# Patient Record
Sex: FEMALE | URBAN - METROPOLITAN AREA
[De-identification: names, ages, dates, MRNs, and addresses within clinical notes are randomized per-mention and may not be internally consistent; named-entity substitution may affect disease eponyms.]

---

## 2019-08-01 ENCOUNTER — APPOINTMENT (RX ONLY)
Dept: URBAN - METROPOLITAN AREA CLINIC 12 | Facility: CLINIC | Age: 49
Setting detail: DERMATOLOGY
End: 2019-08-01

## 2019-08-01 DIAGNOSIS — Z41.9 ENCOUNTER FOR PROCEDURE FOR PURPOSES OTHER THAN REMEDYING HEALTH STATE, UNSPECIFIED: ICD-10-CM

## 2019-08-01 PROCEDURE — ? BOTOX

## 2019-08-01 NOTE — PROCEDURE: BOTOX
Administered By (Optional): Sofía Sawyer
Topical Anesthesia?: 23% lidocaine, 7% tetracaine
Additional Area 1 Units: 0
Glabellar Complex Units: 10
Price Per Unit In $ (Use Numbers Only, No Text Please.): 17
Map Statment: See attached map for complete details
Forehead Units: 5
Expiration Date (Month Year): 01/2022
Lot #: [id number]
Additional Area 1 Location: in the brow
Additional Area 2 Location: Chin
Consent: Written consent was obtained prior to the procedure. Risks, benefits, expectations and alternatives were discussed including, but not limited to, infection, bleeding, lid/brow ptosis, bruising, swelling, diplopia, temporary effects, incomplete chemical denervation and dissatisfaction with the cosmetic outcome. No guarantee or warranty was given or implied regarding longevity of results.
Periorbital Skin Units: 25
Show Price In Note?: yes
Detail Level: Simple
Postcare Instructions: Patient instructed to not lie down for 4 hours and limit physical activity for 24 hours. Patient instructed not to travel by airplane for 48 hours.
Bill Summary Price Listed Below, Or Bill Total Of Units X Price Per Unit?: Bill #Units x Price Per Unit
Additional Area 3 Location: nasalis depressor

## 2019-08-28 ENCOUNTER — APPOINTMENT (RX ONLY)
Dept: URBAN - METROPOLITAN AREA CLINIC 12 | Facility: CLINIC | Age: 49
Setting detail: DERMATOLOGY
End: 2019-08-28

## 2019-08-28 DIAGNOSIS — Z42.8 ENCOUNTER FOR OTHER PLASTIC AND RECONSTRUCTIVE SURGERY FOLLOWING MEDICAL PROCEDURE OR HEALED INJURY: ICD-10-CM

## 2019-08-28 DIAGNOSIS — Z41.9 ENCOUNTER FOR PROCEDURE FOR PURPOSES OTHER THAN REMEDYING HEALTH STATE, UNSPECIFIED: ICD-10-CM

## 2019-08-28 PROCEDURE — ? BOTOX

## 2019-08-28 PROCEDURE — ? HYALURONIDASE INJECTION

## 2019-08-28 ASSESSMENT — LOCATION ZONE DERM: LOCATION ZONE: FACE

## 2019-08-28 ASSESSMENT — LOCATION DETAILED DESCRIPTION DERM: LOCATION DETAILED: RIGHT SUPERIOR CENTRAL MALAR CHEEK

## 2019-08-28 ASSESSMENT — LOCATION SIMPLE DESCRIPTION DERM: LOCATION SIMPLE: RIGHT CHEEK

## 2019-08-28 NOTE — PROCEDURE: BOTOX
Forehead Units: 2.5
Use Map Statement For Sites (Optional): Yes
Additional Area 2 Units: 0
Dilution (U/0.1 Cc): 5
Additional Area 1 Location: in the brow
Bill Summary Price Listed Below, Or Bill Total Of Units X Price Per Unit?: Bill #Units x Price Per Unit
Postcare Instructions: Patient instructed to not lie down for 4 hours and limit physical activity for 24 hours. Patient instructed not to travel by airplane for 48 hours.
Price Per Unit In $ (Use Numbers Only, No Text Please.): 14
Expiration Date (Month Year): 01/2022
Map Statment: See attached map for complete details
Additional Area 2 Location: Chin
Administered By (Optional): Sofía Sawyer
Consent: Written consent was obtained prior to the procedure. Risks, benefits, expectations and alternatives were discussed including, but not limited to, infection, bleeding, lid/brow ptosis, bruising, swelling, diplopia, temporary effects, incomplete chemical denervation and dissatisfaction with the cosmetic outcome. No guarantee or warranty was given or implied regarding longevity of results.
Periorbital Skin Units: 10
Detail Level: Simple
Additional Area 3 Location: necklace lines
Lot #: [id number]

## 2019-08-28 NOTE — PROCEDURE: HYALURONIDASE INJECTION
Total Volume (Ccs): 0.5
Price (Use Numbers Only, No Special Characters Or $): 100
Lot # (Optional): [id number]
Hyaluronidase Preparation: hylenex
Treatment Number (Optional): 1
Expiration Date (Optional): 10/2020
Administered By (Optional): Sofía Sawyer RN
Notes: Patient had Juvederm injected 4 years ago by a different injector.
Filler Previously Used (Optional): Juvederm
Consent: The risks of the procedure including pain, burning, contour defects and dimpling of the skin, and the need for multiple treatments were reviewed with the patient prior to the injection.
Detail Level: Detailed

## 2019-08-29 ENCOUNTER — APPOINTMENT (RX ONLY)
Dept: URBAN - METROPOLITAN AREA CLINIC 12 | Facility: CLINIC | Age: 49
Setting detail: DERMATOLOGY
End: 2019-08-29

## 2019-08-29 DIAGNOSIS — Z41.9 ENCOUNTER FOR PROCEDURE FOR PURPOSES OTHER THAN REMEDYING HEALTH STATE, UNSPECIFIED: ICD-10-CM

## 2019-08-29 PROCEDURE — ? HYALURONIDASE INJECTION

## 2019-08-29 ASSESSMENT — LOCATION ZONE DERM: LOCATION ZONE: FACE

## 2019-08-29 ASSESSMENT — LOCATION DETAILED DESCRIPTION DERM: LOCATION DETAILED: RIGHT SUPERIOR CENTRAL MALAR CHEEK

## 2019-08-29 ASSESSMENT — LOCATION SIMPLE DESCRIPTION DERM: LOCATION SIMPLE: RIGHT CHEEK

## 2019-08-29 NOTE — PROCEDURE: HYALURONIDASE INJECTION
Price (Use Numbers Only, No Special Characters Or $): 100
Lot # (Optional): [id number]
Detail Level: Detailed
Treatment Number (Optional): 1
Consent: The risks of the procedure including pain, burning, contour defects and dimpling of the skin, and the need for multiple treatments were reviewed with the patient prior to the injection.
Expiration Date (Optional): 10/2020
Notes: Patient had Juvederm injected 4 years ago by a different injector.
Total Volume (Ccs): 0.5
Administered By (Optional): Sofía Sawyer RN
Hyaluronidase Preparation: hylenex
Filler Previously Used (Optional): Juvederm

## 2019-09-05 ENCOUNTER — APPOINTMENT (RX ONLY)
Dept: URBAN - METROPOLITAN AREA CLINIC 12 | Facility: CLINIC | Age: 49
Setting detail: DERMATOLOGY
End: 2019-09-05

## 2019-09-05 DIAGNOSIS — Z41.9 ENCOUNTER FOR PROCEDURE FOR PURPOSES OTHER THAN REMEDYING HEALTH STATE, UNSPECIFIED: ICD-10-CM

## 2019-09-05 PROCEDURE — ? HYALURONIDASE INJECTION

## 2019-09-05 ASSESSMENT — LOCATION SIMPLE DESCRIPTION DERM: LOCATION SIMPLE: RIGHT CHEEK

## 2019-09-05 ASSESSMENT — LOCATION DETAILED DESCRIPTION DERM: LOCATION DETAILED: RIGHT SUPERIOR CENTRAL MALAR CHEEK

## 2019-09-05 ASSESSMENT — LOCATION ZONE DERM: LOCATION ZONE: FACE

## 2019-09-05 NOTE — PROCEDURE: HYALURONIDASE INJECTION
Detail Level: Detailed
Administered By (Optional): Sofía Sawyer RN
Lot # (Optional): [id number]
Total Volume (Ccs): 0.5
Expiration Date (Optional): 10/2020
Notes: Patient had Juvederm injected 4 years ago by a different injector.
Consent: The risks of the procedure including pain, burning, contour defects and dimpling of the skin, and the need for multiple treatments were reviewed with the patient prior to the injection.
Treatment Number (Optional): 3
Price (Use Numbers Only, No Special Characters Or $): 100
Hyaluronidase Preparation: hylenex
Filler Previously Used (Optional): Juvederm

## 2019-09-09 ENCOUNTER — APPOINTMENT (RX ONLY)
Dept: URBAN - METROPOLITAN AREA CLINIC 12 | Facility: CLINIC | Age: 49
Setting detail: DERMATOLOGY
End: 2019-09-09

## 2019-09-09 DIAGNOSIS — Z41.9 ENCOUNTER FOR PROCEDURE FOR PURPOSES OTHER THAN REMEDYING HEALTH STATE, UNSPECIFIED: ICD-10-CM

## 2019-09-09 PROCEDURE — ? FILLERS

## 2019-09-09 NOTE — PROCEDURE: FILLERS
Brows Filler Volume In Cc: 0
Lot #: [id number]
Additional Area 2 Location: glabellar line
Additional Area 2 Location: periorbital rim
Topical Anesthetic #2: tetracaine
Filler: Belotero
Additional Area 3 Location: smile line
Show Price In Note?: yes
Topical Anesthetic Base: ointment
Additional Area 3 Location: perioral lines
Additional Area 1 Location: glabellar/nasal root lines
Topical % #2: 7
Expiration Date (Month Year): 06/2020
Additional Area 4 Location: cheek depressions
Consent: Written consent obtained. Risks include but not limited to bruising, beading, irregular texture, ulceration, infection, allergic reaction, scar formation, incomplete augmentation, temporary nature, procedural pain.
Tear Troughs Filler Volume In Cc: 0.2
Administered By (Optional): Sofía Sawyer RN
Price $ (Numeric Only, No Text Please.): 180
Additional Area 4 Location: eyelid and cheek margin
Topical Anesthetic #1: lidocaine
Map Statment: See attached map for complete details
Additional Area 5 Location: frontalis rhytids
Additional Area 5 Location: oral commissures
Detail Level: Simple
Topical % #1: 23

## 2023-05-18 ENCOUNTER — APPOINTMENT (RX ONLY)
Dept: URBAN - NONMETROPOLITAN AREA CLINIC 30 | Facility: CLINIC | Age: 53
Setting detail: DERMATOLOGY
End: 2023-05-18

## 2023-05-18 DIAGNOSIS — Z41.9 ENCOUNTER FOR PROCEDURE FOR PURPOSES OTHER THAN REMEDYING HEALTH STATE, UNSPECIFIED: ICD-10-CM

## 2023-05-18 DIAGNOSIS — L98.8 OTHER SPECIFIED DISORDERS OF THE SKIN AND SUBCUTANEOUS TISSUE: ICD-10-CM

## 2023-05-18 PROCEDURE — ? JUVEDERM VOLLURE XC INJECTION

## 2023-05-18 PROCEDURE — ? COUNSELING

## 2023-05-18 PROCEDURE — ? JUVEDERM VOLUMA XC INJECTION

## 2023-05-18 PROCEDURE — ? INVENTORY

## 2023-05-18 PROCEDURE — ? BOTOX

## 2023-11-02 ENCOUNTER — APPOINTMENT (RX ONLY)
Dept: URBAN - NONMETROPOLITAN AREA CLINIC 30 | Facility: CLINIC | Age: 53
Setting detail: DERMATOLOGY
End: 2023-11-02

## 2023-11-02 DIAGNOSIS — Z41.9 ENCOUNTER FOR PROCEDURE FOR PURPOSES OTHER THAN REMEDYING HEALTH STATE, UNSPECIFIED: ICD-10-CM

## 2023-11-02 PROCEDURE — ? DYSPORT

## 2023-11-02 PROCEDURE — ? JUVEDERM VOLLURE XC INJECTION

## 2023-11-02 PROCEDURE — ? SKINVIVE INJECTION

## 2023-11-02 PROCEDURE — ? INVENTORY
